# Patient Record
Sex: MALE | Race: WHITE | NOT HISPANIC OR LATINO | Employment: PART TIME | URBAN - METROPOLITAN AREA
[De-identification: names, ages, dates, MRNs, and addresses within clinical notes are randomized per-mention and may not be internally consistent; named-entity substitution may affect disease eponyms.]

---

## 2020-06-13 ENCOUNTER — HOSPITAL ENCOUNTER (EMERGENCY)
Facility: HOSPITAL | Age: 40
Discharge: HOME | End: 2020-06-13
Attending: EMERGENCY MEDICINE

## 2020-06-13 VITALS
HEIGHT: 76 IN | TEMPERATURE: 99 F | OXYGEN SATURATION: 99 % | DIASTOLIC BLOOD PRESSURE: 83 MMHG | SYSTOLIC BLOOD PRESSURE: 140 MMHG | HEART RATE: 62 BPM | WEIGHT: 190 LBS | RESPIRATION RATE: 18 BRPM | BODY MASS INDEX: 23.14 KG/M2

## 2020-06-13 DIAGNOSIS — S91.312A LACERATION OF LEFT FOOT, INITIAL ENCOUNTER: Primary | ICD-10-CM

## 2020-06-13 PROCEDURE — 99284 EMERGENCY DEPT VISIT MOD MDM: CPT | Mod: 25

## 2020-06-13 PROCEDURE — 25000003 PHARM REV CODE 250: Performed by: PHYSICIAN ASSISTANT

## 2020-06-13 PROCEDURE — 12001 RPR S/N/AX/GEN/TRNK 2.5CM/<: CPT

## 2020-06-13 RX ORDER — MUPIROCIN 20 MG/G
OINTMENT TOPICAL 3 TIMES DAILY
Qty: 15 G | Refills: 0 | Status: SHIPPED | OUTPATIENT
Start: 2020-06-13 | End: 2020-06-20

## 2020-06-13 RX ORDER — MUPIROCIN 20 MG/G
1 OINTMENT TOPICAL
Status: COMPLETED | OUTPATIENT
Start: 2020-06-13 | End: 2020-06-13

## 2020-06-13 RX ORDER — LIDOCAINE HYDROCHLORIDE 10 MG/ML
10 INJECTION INFILTRATION; PERINEURAL
Status: COMPLETED | OUTPATIENT
Start: 2020-06-13 | End: 2020-06-13

## 2020-06-13 RX ADMIN — LIDOCAINE HYDROCHLORIDE 10 ML: 10 INJECTION, SOLUTION INFILTRATION; PERINEURAL at 07:06

## 2020-06-13 RX ADMIN — MUPIROCIN 22 G: 20 OINTMENT TOPICAL at 08:06

## 2020-06-13 NOTE — ED TRIAGE NOTES
Pt presents to the ED via personal transportation reporting a cut in between his 2nd and 3rd toe to the left foot.

## 2020-06-14 NOTE — DISCHARGE INSTRUCTIONS
Apply Bactroban to prevent infection.  Keep area clean dry and covered.  Take ibuprofen as needed for pain.  Follow up with primary care in 2 days.  Return ER for fever, worsening pain or swelling, purulent drainage or as needed.

## 2025-07-11 ENCOUNTER — HOSPITAL ENCOUNTER (EMERGENCY)
Facility: HOSPITAL | Age: 45
Discharge: HOME | End: 2025-07-11
Attending: EMERGENCY MEDICINE

## 2025-07-11 VITALS
WEIGHT: 190 LBS | TEMPERATURE: 98 F | RESPIRATION RATE: 18 BRPM | HEART RATE: 49 BPM | DIASTOLIC BLOOD PRESSURE: 89 MMHG | OXYGEN SATURATION: 100 % | SYSTOLIC BLOOD PRESSURE: 138 MMHG | BODY MASS INDEX: 23.14 KG/M2 | HEIGHT: 76 IN

## 2025-07-11 DIAGNOSIS — S62.639B OPEN FRACTURE OF DISTAL PHALANX OF DIGIT OF LEFT HAND: Primary | ICD-10-CM

## 2025-07-11 DIAGNOSIS — S61.319A LACERATION OF NAIL BED OF FINGER, INITIAL ENCOUNTER: ICD-10-CM

## 2025-07-11 PROCEDURE — 63600175 PHARM REV CODE 636 W HCPCS

## 2025-07-11 PROCEDURE — 96372 THER/PROPH/DIAG INJ SC/IM: CPT

## 2025-07-11 PROCEDURE — 12001 RPR S/N/AX/GEN/TRNK 2.5CM/<: CPT

## 2025-07-11 PROCEDURE — 99284 EMERGENCY DEPT VISIT MOD MDM: CPT | Mod: 25

## 2025-07-11 PROCEDURE — 25000003 PHARM REV CODE 250

## 2025-07-11 RX ORDER — LIDOCAINE HYDROCHLORIDE 10 MG/ML
10 INJECTION, SOLUTION INFILTRATION; PERINEURAL
Status: COMPLETED | OUTPATIENT
Start: 2025-07-11 | End: 2025-07-11

## 2025-07-11 RX ORDER — ACETAMINOPHEN 500 MG
1000 TABLET ORAL
Status: COMPLETED | OUTPATIENT
Start: 2025-07-11 | End: 2025-07-11

## 2025-07-11 RX ORDER — CEFAZOLIN SODIUM 1 G/3ML
2 INJECTION, POWDER, FOR SOLUTION INTRAMUSCULAR; INTRAVENOUS
Status: COMPLETED | OUTPATIENT
Start: 2025-07-11 | End: 2025-07-11

## 2025-07-11 RX ORDER — SULFAMETHOXAZOLE AND TRIMETHOPRIM 800; 160 MG/1; MG/1
1 TABLET ORAL 2 TIMES DAILY
Qty: 20 TABLET | Refills: 0 | Status: SHIPPED | OUTPATIENT
Start: 2025-07-11 | End: 2025-07-21

## 2025-07-11 RX ORDER — IBUPROFEN 800 MG/1
800 TABLET, FILM COATED ORAL EVERY 6 HOURS PRN
Qty: 20 TABLET | Refills: 0 | Status: SHIPPED | OUTPATIENT
Start: 2025-07-11

## 2025-07-11 RX ADMIN — CEFAZOLIN 2 G: 330 INJECTION, POWDER, FOR SOLUTION INTRAMUSCULAR; INTRAVENOUS at 01:07

## 2025-07-11 RX ADMIN — LIDOCAINE HYDROCHLORIDE 10 ML: 10 INJECTION, SOLUTION INFILTRATION; PERINEURAL at 11:07

## 2025-07-11 RX ADMIN — ACETAMINOPHEN 1000 MG: 500 TABLET, FILM COATED ORAL at 12:07

## 2025-07-11 NOTE — ED PROVIDER NOTES
Encounter Date: 7/11/2025       History     Chief Complaint   Patient presents with    Laceration     Pt to ED c/o lacerations to index and middle finger of left hand from a hedger while trimming the garden.      45 y.o. male with no chronic PMHx presents for emergent evaluation of laceration to left digits.  States he was using an electric  when he grabbed kateryna to hold them down but  slipped cutting distal aspect of his 2nd and 3rd fingers.  He describes pain as a aching throb at this time but tolerable.  States it is sensation is intact.  Describes numbness, tingling,  profuse bleeding or any other complaints at this time.  States his tetanus is up-to-date.  No attempted treatment prior to arrival.  No other complaints at this time.  Tetanus up-to-date.    The history is provided by the patient.     Review of patient's allergies indicates:  No Known Allergies  History reviewed. No pertinent past medical history.  History reviewed. No pertinent surgical history.  No family history on file.  Social History[1]  Review of Systems   Constitutional:  Negative for chills and fever.   HENT:  Negative for sore throat.    Respiratory:  Negative for shortness of breath.    Cardiovascular:  Negative for chest pain.   Gastrointestinal:  Negative for abdominal pain, diarrhea, nausea and vomiting.   Genitourinary:  Negative for decreased urine volume, dysuria, hematuria and testicular pain.   Musculoskeletal:  Negative for myalgias.   Skin:  Positive for wound. Negative for rash.   Neurological:  Negative for weakness and headaches.   Psychiatric/Behavioral: Negative.         Physical Exam     Initial Vitals [07/11/25 1143]   BP Pulse Resp Temp SpO2   (!) 159/93 60 18 97.9 °F (36.6 °C) 99 %      MAP       --         Physical Exam    Nursing note and vitals reviewed.  Constitutional: He appears well-developed and well-nourished. He is not diaphoretic. No distress.   HENT:   Head: Normocephalic and  atraumatic.   Right Ear: External ear normal.   Left Ear: External ear normal.   Eyes: Conjunctivae and EOM are normal.   Neck: Neck supple.   Normal range of motion.  Cardiovascular:  Normal rate.           Pulmonary/Chest: No stridor. No respiratory distress.   Musculoskeletal:         General: Normal range of motion.      Left hand: Laceration present.      Cervical back: Normal range of motion and neck supple.     Neurological: He is alert and oriented to person, place, and time. No sensory deficit.   Skin: Capillary refill takes less than 2 seconds. No rash noted.   Laceration to distal aspect of left 2nd and 3rd digits with nailbed involvement.  Full range of motion noted to MCP, PIP and DIP.  Sensation intact.  Normal capillary refill.  No visible bone or foreign bodies.  Bleeding controlled.  2+ radial pulse    SEE ATTACHED IMAGES   Psychiatric: He has a normal mood and affect. Thought content normal.               ED Course   Lac Repair    Date/Time: 7/11/2025 12:45 PM    Performed by: Constance Kang PA-C  Authorized by: Jose Hamlin MD    Consent:     Consent obtained:  Verbal    Consent given by:  Patient    Risks, benefits, and alternatives were discussed: yes      Risks discussed:  Infection, need for additional repair, nerve damage, poor cosmetic result, poor wound healing, retained foreign body and pain  Anesthesia:     Anesthesia method:  Local infiltration    Local anesthetic:  Lidocaine 1% w/o epi  Laceration details:     Location:  Finger    Finger location:  L index finger    Length (cm):  1  Pre-procedure details:     Preparation:  Imaging obtained to evaluate for foreign bodies  Exploration:     Limited defect created (wound extended): yes      Hemostasis achieved with:  Tourniquet    Imaging obtained: x-ray      Wound exploration: wound explored through full range of motion    Treatment:     Area cleansed with:  Povidone-iodine and saline    Amount of cleaning:  Extensive    Irrigation  method:  Pressure wash    Undermining:  Minimal  Skin repair:     Repair method:  Sutures    Suture technique:  Simple interrupted  Repair type:     Repair type:  Complex  Post-procedure details:     Dressing:  Splint for protection and non-adherent dressing    Procedure completion:  Tolerated well, no immediate complications  Comments:      2 simple interrupted 4-0 Vicryl rapide sutures placed to close nailbed laceration with good approximation.  4 simple interrupted 5-0 Prolene used to tac nail back down and to close surrounding extending lack to skin around nailbed. Patient tolerated well and pleased with result.  Lac Repair    Date/Time: 7/11/2025 8:23 PM    Performed by: Constance Kang PA-C  Authorized by: Jose Hamlin MD    Consent:     Consent obtained:  Verbal    Consent given by:  Patient    Risks, benefits, and alternatives were discussed: yes      Risks discussed:  Infection, need for additional repair, nerve damage, poor wound healing, pain and retained foreign body  Anesthesia:     Anesthesia method:  Local infiltration    Local anesthetic:  Lidocaine 1% w/o epi  Laceration details:     Location:  Finger    Finger location:  L index finger    Length (cm):  1  Exploration:     Imaging obtained: x-ray    Treatment:     Area cleansed with:  Povidone-iodine and saline    Amount of cleaning:  Extensive    Irrigation method:  Pressure wash  Skin repair:     Repair method:  Sutures  Approximation:     Approximation:  Close  Repair type:     Repair type:  Complex  Post-procedure details:     Dressing:  Splint for protection and non-adherent dressing    Procedure completion:  Tolerated well, no immediate complications  Comments:      3 simple interrupted 4-0 Vicryl rapide sutures placed to close nailbed laceration with good approximation.  6 simple interrupted 5-0 Prolene used to tac nail back down and to close surrounding extending lack to skin around nailbed.  Patient tolerated well and pleased with  results    Labs Reviewed - No data to display       Imaging Results              X-Ray Finger 2 or More Views Left (Final result)  Result time 07/11/25 13:28:06      Final result by Luis Vasquez MD (07/11/25 13:28:06)                   Impression:      Acute fractures of the 2nd and 3rd distal phalanges with overlying soft tissue laceration type injury, as above.  Correlate for depth of laceration and potential injury to the nail beds.      Electronically signed by: Luis Vasquez MD  Date:    07/11/2025  Time:    13:28               Narrative:    EXAMINATION:  XR FINGER 2 OR MORE VIEWS LEFT    CLINICAL HISTORY:  laceration w/ nail bed involvement 2nd-3rd digit;    TECHNIQUE:  Three views    COMPARISON:  None    FINDINGS:  There is minimally displaced fracture at the tuft of the 3rd distal phalanx there is surrounding skin and soft tissue irregularity suggesting superimposed laceration type injury.  There is nondisplaced incomplete fracture involving the mid to distal shaft of the 2nd distal phalanx.  There is surrounding skin and soft tissue irregularity suggesting superimposed laceration type injury.    No dislocation or destructive osseous process.  Joint spaces appear relatively maintained.  No radiopaque foreign body.                                       Medications   LIDOcaine HCL 10 mg/ml (1%) injection 10 mL (10 mLs Infiltration Given by Provider 7/11/25 1145)   acetaminophen tablet 1,000 mg (1,000 mg Oral Given 7/11/25 1234)   ceFAZolin injection 2 g (2 g Intramuscular Given 7/11/25 1325)     Medical Decision Making  This is an evaluation of a 45 y.o. male that presents to the Emergency Department for a Laceration. Physical Exam shows a non-toxic, afebrile, and well appearing male. There is a laceration to distal aspect of left 2nd and 3rd digits with nailbed involvement. There is no surrounding erythema or area of increased warmth. Compartments are soft. There is full ROM of MCP, PIP and DIP against  resistance. Equal sensation to the extremity.  Capillary refill. No visible tendon lacerations observed on exam.  Vital signs stable and reassuring.  Tetanus up-to-date per chart review.       The wound was irrigated and visually inspected prior to closure. No visible foreign bodies noted. Xray noting acute fractures of the 2nd and 3rd distal phalanges with overlying soft tissue laceration type injury, as above.    I have consulted Dr. Buckner, orthopedic on-call regarding the patient's presentation and study results.  After review of pictures, she requested complete removal of nail with laceration repair with outpatient antibiotics and follow up in clinic. Dr. Hamlin, ER attending personally evaluated this patient and assisted with nailbed removal and recommended repair technique. See procedure note and ED course for full detail. Administered 2 g Ancef IM and discharged home with Bactrim per Dr. Buckner request. She states her team will contact patient regarding follow up.     Patient tolerated procedure well and pleased with results.  Pain controlled.  I considered, but at this time, do not suspect cellulitis, compartment syndrome, underlying fracture, tendon injury, or suspect any retained foreign body at this time.     Laceration/Wound Care/Suture Removal instructions given.  Emphasized the importance of completing antibiotics as prescribed and advised on supportive care.  The diagnosis, treatment plan, instructions for follow-up and reevaluation with orthopedics for suture removal which will need to be completed in 7 days but did emphasized that appropriate follow up should be completed before that time. ED return precautions were discussed and understanding was verbalized. All questions or concerns have been addressed.     Amount and/or Complexity of Data Reviewed  External Data Reviewed: labs and notes.  Radiology: ordered. Decision-making details documented in ED Course.    Risk  OTC drugs.  Prescription drug  management.  Diagnosis or treatment significantly limited by social determinants of health.               ED Course as of 07/11/25 2041 Fri Jul 11, 2025   1213 Tdap 12/2/21 [CC]   4626 X-Ray Finger 2 or More Views Left  There is minimally displaced fracture at the tuft of the 3rd distal phalanx there is surrounding skin and soft tissue irregularity suggesting superimposed laceration type injury.  There is nondisplaced incomplete fracture involving the mid to distal shaft of the 2nd distal phalanx.  There is surrounding skin and soft tissue irregularity suggesting superimposed laceration type injury [CC]   1356 Wound cleaned well and evaluated. I have consulted Dr. Buckner from the orthopedic service regarding the patient's presentation and study results.  At this time, the recommendation is bedside repair with administration of IM Ancef 2 g.  Discharge home with oral antibiotics with follow up with her on Tuesday.     [CC]   2001 45-year-old male presenting with a laceration to the tips of the left 2nd and 3rd digits.  Incident occurred with a .     MDM   The nails were pulled back to expose the nail bed.  The lacerations were irrigated with Betadine and saline.  The wounds were sutured.  The nail was replaced in the nailbed and adhered with sutures.  Orthopedics was consulted for distal tuft open fracture.  The patient was discharged on Bactrim.  Follow up with Orthopedics for re-evaluation and possible further fixation of the wound. [JM]      ED Course User Index  [CC] Constance Kang PA-C  [JM] Jose Hamlin MD                           Clinical Impression:  Final diagnoses:  [G52.033U] Open fracture of distal phalanx of digit of left hand (Primary)  [S61.319A] Laceration of nail bed of finger, initial encounter          ED Disposition Condition    Discharge Stable          ED Prescriptions       Medication Sig Dispense Start Date End Date Auth. Provider    ibuprofen (ADVIL,MOTRIN) 800 MG  tablet Take 1 tablet (800 mg total) by mouth every 6 (six) hours as needed for Pain. 20 tablet 7/11/2025 -- Constance Kang PA-C    sulfamethoxazole-trimethoprim 800-160mg (BACTRIM DS) 800-160 mg Tab Take 1 tablet by mouth 2 (two) times daily. for 10 days 20 tablet 7/11/2025 7/21/2025 Constance Kang PA-C          Follow-up Information       Follow up With Specialties Details Why Contact Info    [facility] - Emergency Dept Emergency Medicine Go to  For new or worsening symptoms [address] [facility] [place] [id number] [phone]    Ann Buckner MD Orthopedic Surgery   [address] [place] [id number]  [phone]                     [1]   Social History  Tobacco Use    Smoking status: Never    Smokeless tobacco: Never   Substance Use Topics    Alcohol use: Not Currently        Constance Kang PA-C  07/11/25 2041

## 2025-07-11 NOTE — ED TRIAGE NOTES
Pt reports he was performing lawn work and accidentally cut his 1st and 2nd digits on left hand.  Bleeding controlled.  Up to date on tetanus.   Quality 226: Preventive Care And Screening: Tobacco Use: Screening And Cessation Intervention: Patient screened for tobacco use and is an ex/non-smoker Detail Level: Detailed

## 2025-07-11 NOTE — DISCHARGE INSTRUCTIONS
Please keep your wound clean and dry for first 24 hours.  Wear splint as needed for support and compression.  After 24 hours you may wash gently with soap and water but do not saturate.  Complete antibiotics as prescribed. Drink lots of fluids, stay well hydrated. Alternate Tylenol/Ibuprofen as needed for pain/ fever; go back and forth between these two medications every 4 hrs as needed for temp greater than or equal to 100.4F.  You may take 800 ibuprofen and 500 to a 1000 mg of Tylenol at 1 time.  Maximum dose of Tylenol and 1 day is 3000 mg in the maximum dose of ibuprofen and 1 day is 1200mg.   It is important that you follow up with Orthopedic within the next week or sooner as advised.  An [facility] team member should be reaching out to you to help schedule the appointment.  If you do not hear from us call the nursing hotline number on paperwork.  Please watch for signs of infection including: increased\spreading redness, swelling, pus-like discharge, or a fever greater than 100.4F. If you experience any of these, please contact your Primary Care Doctor or Return to the Emergency Department for a wound check.

## 2025-07-14 ENCOUNTER — TELEPHONE (OUTPATIENT)
Dept: ORTHOPEDICS | Facility: CLINIC | Age: 45
End: 2025-07-14

## 2025-07-14 NOTE — PROGRESS NOTES
Assessment: 45 y.o. male with L laceration of 2nd and 3rd digits, ED follow up    I explained my diagnostic impression and the reasoning behind it in detail, using layman's terms.      Plan:   - continue local wound care  - no painful activity  - return to clinic next week for suture removal    All questions were answered in detail. The patient is in full agreement with the treatment plan and will proceed accordingly.    Chief Complaint   Patient presents with    Left Hand - Finger Injury, Pain, Injury, Swelling       Initial visit (7/17/25): Андрей Dial is a 45 y.o. male who presents today complaining of Finger Injury, Pain, Injury, and Swelling of the Left Hand     Андрей presents for follow-up of finger injuries sustained 6-7 days ago. While trimming hedges, he sustained lacerations to the left index and long  fingers with associated distal phalanx fractures. He was seen in the ER and treated with nail removal, closure of nailbed and fingertip lacerations, and nail reattachment. He has been using non-stick pads, white tape, and an ace bandage for coverage. He initially had difficulty removing bandages due to bleeding and adherence. Currently, he denies significant pain and reports it feels good to bend his fingers when unbandaged. He has been avoiding wetting the injuries as instructed.    This is the extent of the patient's complaints at this time.         Review of patient's allergies indicates:  No Known Allergies    Current Medications[1]    Physical Exam:   Vitals:    07/17/25 1333   PainSc:   2   PainLoc: Finger       General:  Patient is alert, awake and oriented to time, place and person. Mood and affect are appropriate.  Patient does not appear to be in any distress, denies any constitutional symptoms and appears stated age.   HEENT:  Pupils are equal and round, sclera are not injected. External examination of ears and nose reveals no abnormalities. Cranial nerves II-X are grossly  · Patient is a 76-year-old female with past medical history of severe persistent asthma, non-small cell lung cancer, diastolic CHF, hypertension, apnea, CAD presenting with shortness of breath concerning for asthma exacerbation  · Chest x-ray no acute cardiopulmonary disease  · Currently on room air  · Started on IV Solu-Medrol 40 mg every 6 hours, nebulizer treatment  · Pulmonology has been consulted  · She follows with Dr. Vanessa Bring intact  Skin:  no rashes, abrasions or open wounds on the affected extremity   Resp:  No respiratory distress or audible wheezing   CV: 2+  pulses, all extremities warm and well perfused   Left Hand   Closed lacerations to index and long fingers   Nails replaced to involved fingers - held in place with suture   Diminished sensation to long finger tip, intact to index  BCR all digits   No devitalized tissue      Imaging: 3 views of the left hand show fractures of the 2nd and 3rd distal phlanx    I personally reviewed and interpreted the patient's imaging obtained prior to visit             This note was created by combination of typed  and M-Modal dictation. Transcription and phonetic errors may be present.  If there are any questions, please contact me.    No past medical history on file.    There are no active problems to display for this patient.      No past surgical history on file.    No family history on file.             [1]   Current Outpatient Medications:     ibuprofen (ADVIL,MOTRIN) 800 MG tablet, Take 1 tablet (800 mg total) by mouth every 6 (six) hours as needed for Pain., Disp: 20 tablet, Rfl: 0    sulfamethoxazole-trimethoprim 800-160mg (BACTRIM DS) 800-160 mg Tab, Take 1 tablet by mouth 2 (two) times daily. for 10 days, Disp: 20 tablet, Rfl: 0

## 2025-07-14 NOTE — TELEPHONE ENCOUNTER
Tried to LVM. Mailbox is full. Patient needs to be scheduled for ED follow up. Will send appointment via mail.     Radha Dockery MS, ATC, OTC  Clinical/Surgical Assistant - Dr. Ann Buckner  Orthopedics  Phone: [phone]

## 2025-07-17 ENCOUNTER — OFFICE VISIT (OUTPATIENT)
Dept: ORTHOPEDICS | Facility: CLINIC | Age: 45
End: 2025-07-17

## 2025-07-17 DIAGNOSIS — S61.319A LACERATION OF NAIL BED OF FINGER, INITIAL ENCOUNTER: ICD-10-CM

## 2025-07-17 DIAGNOSIS — S62.639B OPEN FRACTURE OF DISTAL PHALANX OF DIGIT OF LEFT HAND: ICD-10-CM

## 2025-07-17 PROCEDURE — 99999 PR PBB SHADOW E&M-EST. PATIENT-LVL III: CPT | Mod: PBBFAC,,, | Performed by: ORTHOPAEDIC SURGERY

## 2025-07-17 PROCEDURE — 99213 OFFICE O/P EST LOW 20 MIN: CPT | Mod: PBBFAC,PN | Performed by: ORTHOPAEDIC SURGERY

## 2025-07-21 NOTE — PROGRESS NOTES
Assessment: 45 y.o. male with L laceration of 2nd and 3rd digits, ED follow up    I explained my diagnostic impression and the reasoning behind it in detail, using layman's terms.      Plan:   - Suture removal   - Discussed nails - left on to splint eponychial fold, will fall off on their own. May be in next few days   - Activity as tolerated  - Wait to soak until next week   - Keep clean and dry this week  - RTC PRN    All questions were answered in detail. The patient is in full agreement with the treatment plan and will proceed accordingly.    Chief Complaint   Patient presents with    Left Hand - Pain, Finger Injury       Initial visit (7/17/25): Андрей Dial is a 45 y.o. male who presents today complaining of Pain and Finger Injury of the Left Hand     Андрей presents for follow-up of finger injuries sustained 6-7 days ago. While trimming hedges, he sustained lacerations to the left index and long  fingers with associated distal phalanx fractures. He was seen in the ER and treated with nail removal, closure of nailbed and fingertip lacerations, and nail reattachment. He has been using non-stick pads, white tape, and an ace bandage for coverage. He initially had difficulty removing bandages due to bleeding and adherence. Currently, he denies significant pain and reports it feels good to bend his fingers when unbandaged. He has been avoiding wetting the injuries as instructed.    7/23/25  Patient returns for suture removal for L laceration of 2nd and 3rd digits.   Pain improving   Here for previously discussed suture removal     This is the extent of the patient's complaints at this time.       Review of patient's allergies indicates:  No Known Allergies    Current Medications[1]    Physical Exam:   Vitals:    07/23/25 1319   PainSc: 0-No pain   PainLoc: Finger     General:  Patient is alert, awake and oriented to time, place and person. Mood and affect are appropriate.  Patient does not appear to be  in any distress, denies any constitutional symptoms and appears stated age.   HEENT:  Pupils are equal and round, sclera are not injected. External examination of ears and nose reveals no abnormalities. Cranial nerves II-X are grossly intact  Skin:  no rashes, abrasions or open wounds on the affected extremity   Resp:  No respiratory distress or audible wheezing   CV: 2+  pulses, all extremities warm and well perfused   Left Hand   Closed lacerations to index and long fingers   Nails replaced to involved fingers - held in place with suture   Diminished sensation to long finger tip, intact to index  BCR all digits   No devitalized tissue - dusky areas more pink this week     Imaging: 3 views of the left hand show fractures of the 2nd and 3rd distal phlanx    I personally reviewed and interpreted the patient's imaging obtained prior to visit         This note was created by combination of typed  and M-Modal dictation. Transcription and phonetic errors may be present.  If there are any questions, please contact me.    No past medical history on file.    There are no active problems to display for this patient.      No past surgical history on file.    No family history on file.               [1]   Current Outpatient Medications:     ibuprofen (ADVIL,MOTRIN) 800 MG tablet, Take 1 tablet (800 mg total) by mouth every 6 (six) hours as needed for Pain., Disp: 20 tablet, Rfl: 0    sulfamethoxazole-trimethoprim 800-160mg (BACTRIM DS) 800-160 mg Tab, Take 1 tablet by mouth 2 (two) times daily. for 10 days, Disp: 20 tablet, Rfl: 0

## 2025-07-23 ENCOUNTER — OFFICE VISIT (OUTPATIENT)
Dept: ORTHOPEDICS | Facility: CLINIC | Age: 45
End: 2025-07-23

## 2025-07-23 DIAGNOSIS — S61.319A LACERATION OF NAIL BED OF FINGER, INITIAL ENCOUNTER: ICD-10-CM

## 2025-07-23 DIAGNOSIS — S62.639B OPEN FRACTURE OF DISTAL PHALANX OF DIGIT OF LEFT HAND: Primary | ICD-10-CM

## 2025-07-23 PROCEDURE — 99212 OFFICE O/P EST SF 10 MIN: CPT | Mod: PBBFAC,PN | Performed by: ORTHOPAEDIC SURGERY

## 2025-07-23 PROCEDURE — 99499 UNLISTED E&M SERVICE: CPT | Mod: S$PBB,,, | Performed by: ORTHOPAEDIC SURGERY

## 2025-07-23 PROCEDURE — 99999 PR PBB SHADOW E&M-EST. PATIENT-LVL II: CPT | Mod: PBBFAC,,, | Performed by: ORTHOPAEDIC SURGERY
